# Patient Record
Sex: FEMALE | Race: WHITE | URBAN - METROPOLITAN AREA
[De-identification: names, ages, dates, MRNs, and addresses within clinical notes are randomized per-mention and may not be internally consistent; named-entity substitution may affect disease eponyms.]

---

## 2019-04-07 ENCOUNTER — HOSPITAL ENCOUNTER (EMERGENCY)
Facility: CLINIC | Age: 54
Discharge: HOME | End: 2019-04-07
Attending: EMERGENCY MEDICINE | Admitting: EMERGENCY MEDICINE
Payer: OTHER GOVERNMENT

## 2019-04-07 VITALS
TEMPERATURE: 98.3 F | RESPIRATION RATE: 16 BRPM | DIASTOLIC BLOOD PRESSURE: 80 MMHG | OXYGEN SATURATION: 93 % | SYSTOLIC BLOOD PRESSURE: 133 MMHG | HEART RATE: 78 BPM

## 2019-04-07 DIAGNOSIS — G50.0 TRIGEMINAL NEURALGIA: ICD-10-CM

## 2019-04-07 PROCEDURE — 99282 EMERGENCY DEPT VISIT SF MDM: CPT

## 2019-04-07 RX ORDER — HYDROCODONE BITARTRATE AND ACETAMINOPHEN 5; 325 MG/1; MG/1
1 TABLET ORAL EVERY 6 HOURS PRN
Qty: 10 TABLET | Refills: 0 | Status: SHIPPED | OUTPATIENT
Start: 2019-04-07 | End: 2019-04-10

## 2019-04-07 RX ORDER — CARBAMAZEPINE 100 MG/1
TABLET, EXTENDED RELEASE ORAL
Qty: 42 TABLET | Refills: 0 | Status: SHIPPED | OUTPATIENT
Start: 2019-04-07

## 2019-04-07 SDOH — HEALTH STABILITY: MENTAL HEALTH: HOW OFTEN DO YOU HAVE A DRINK CONTAINING ALCOHOL?: NEVER

## 2019-04-07 ASSESSMENT — ENCOUNTER SYMPTOMS
FEVER: 0
FACIAL SWELLING: 0

## 2019-04-07 NOTE — ED PROVIDER NOTES
History     Chief Complaint:  Facial pain    HPI   Keren Davila is a 53 year old female who presents to the emergency department today for evaluation of facial pain. She endorses a history of trigeminal neuralgia in  and this pain feels similar. She states at that time she was on tegretol for about 6 months and has not needed it since. The patient describes her pain as an electric shock. She states she gets an intermittent tooth ache. She denies fevers or facial swelling.      Allergies:  Sulfa drugs      Medications:    Bupropion  Prozac     Past Medical History:    PMDD  Trigeminal neuralgia     Past Surgical History:     section    Family History:    History reviewed. No pertinent family history.      Social History:  Smoking Status: Never Smoker  Smokeless Tobacco: Never Used  Alcohol Use: Negative   Marital Status:        Review of Systems   Constitutional: Negative for fever.   HENT: Positive for dental problem. Negative for facial swelling.         Facial pain   All other systems reviewed and are negative.        Physical Exam     Patient Vitals for the past 24 hrs:   BP Temp Temp src Pulse Resp SpO2   19 0630 (!) 143/94 -- -- -- -- --   19 0619 (!) 152/105 98.3  F (36.8  C) Temporal 92 16 93 %      Physical Exam   Constitutional: No distress.   HENT:   Head: Atraumatic.   Right Ear: External ear normal.   Left Ear: External ear normal.   Mouth/Throat: Oropharynx is clear and moist. No oropharyngeal exudate.   Palpable tenderness to the left face diffusely with light pressure. No obvious lesion. No facial swelling or redness. No dental pain with percussion of left sided teeth.    Eyes: Pupils are equal, round, and reactive to light. Conjunctivae and EOM are normal. No scleral icterus.   Neck: Neck supple.   Cardiovascular: Normal heart sounds and intact distal pulses.   Pulmonary/Chest: Breath sounds normal. No respiratory distress.   Musculoskeletal: She exhibits no  edema.   Neurological: She is alert.   Skin: Skin is warm and dry. No rash noted. She is not diaphoretic.   Psychiatric: She has a normal mood and affect.     Emergency Department Course     Emergency Department Course:    ED Course as of Apr 07 0707   Sun Apr 07, 2019   0649 I performed a thorough exam of the patient.       0706 I personally answered all related questions prior to discharge.             Impression & Plan      Medical Decision Making:  Keren Davila is a 53 year old female who presents to the emergency department today for evaluation of possible recurrent trigemina neuralgia. She has had this in the past ion the right side of her face and was prescribed tegretol, she is no longer on this medication. Evaluation did not reveal and facial infection, no dental infection, although she does think she may ave an issue with a left upper molar, but that area is not tender to palpation or percussion. The gum looks normal, no signs of an abscess drainage. She is not having trouble with eating using that side of her mouth, so I doubt this is a dental issue. She does have diffuse tenderness which is described as electric shock and very similar in distribution as her last episode, so I think this is very likely trigeminal neuralgia. She did very well with tegretol and would like to be back on it. I did prescribe her tegretol in increasing dose and she knows how to self titrate at that point. I will also prescribe her Vicodin to cover until the tegretol takes place. She is given narcotic warning. She voiced understanding and will follow up when she gets back to [place] and see her doctor for the dental issue as well.     Diagnosis:    ICD-10-CM    1. Trigeminal neuralgia G50.0      Disposition:   The patient is discharged to home.     Discharge Medications:  START taking      Dose / Directions   carbamazepine 100 MG 12 hr tablet  Commonly known as:  Tegretol XR      Start at 100mg twice daily and increase it  by 200mg every 2 days until pain relief is achieved. Titrate slowly. No more than 600mg twice daily  Quantity:  42 tablet  Refills:  0     HYDROcodone-acetaminophen 5-325 MG tablet  Commonly known as:  NORCO      Dose:  1 tablet  Take 1 tablet by mouth every 6 hours as needed for severe pain  Quantity:  10 tablet  Refills:  0           Where to get your medicines      Some of these will need a paper prescription and others can be bought over the counter. Ask your nurse if you have questions.    Bring a paper prescription for each of these medications    carBAMazepine 100 MG 12 hr tablet    HYDROcodone-acetaminophen 5-325 MG tablet        Scribe Disclosure:  I, Jane Thompson, am serving as a scribe at 6:55 AM on 4/7/2019 to document services personally performed by Trevon Harry MD based on my observations and the provider's statements to me.      [facility] EMERGENCY DEPARTMENT       Trevon Harry MD  04/07/19 [id number]

## 2019-04-07 NOTE — ED AVS SNAPSHOT
[facility] Emergency Department  [address]  [facility] [address]  Phone:  [phone]  Fax:  [phone]                                    Keren Davila   MRN: [id number]    Department:  [facility] Emergency Department   Date of Visit:  4/7/2019           After Visit Summary Signature Page    I have received my discharge instructions, and my questions have been answered. I have discussed any challenges I see with this plan with the nurse or doctor.    ..........................................................................................................................................  Patient/Patient Representative Signature      ..........................................................................................................................................  Patient Representative Print Name and Relationship to Patient    ..................................................               ................................................  Date                                   Time    ..........................................................................................................................................  Reviewed by Signature/Title    ...................................................              ..............................................  Date                                               Time          22EPIC Rev 08/18

## 2019-04-07 NOTE — ED TRIAGE NOTES
"Left facial pain, and \"It's a bitch\". 2 weeks on and off.    Pt A&O x 3, CMS x 3, ABCD's adequate in triage    "